# Patient Record
Sex: MALE | Race: WHITE | NOT HISPANIC OR LATINO | Employment: FULL TIME | URBAN - METROPOLITAN AREA
[De-identification: names, ages, dates, MRNs, and addresses within clinical notes are randomized per-mention and may not be internally consistent; named-entity substitution may affect disease eponyms.]

---

## 2021-11-22 ENCOUNTER — OFFICE VISIT (OUTPATIENT)
Dept: FAMILY MEDICINE CLINIC | Facility: CLINIC | Age: 26
End: 2021-11-22

## 2021-11-22 VITALS
OXYGEN SATURATION: 98 % | WEIGHT: 140 LBS | SYSTOLIC BLOOD PRESSURE: 128 MMHG | BODY MASS INDEX: 18.55 KG/M2 | HEART RATE: 110 BPM | DIASTOLIC BLOOD PRESSURE: 76 MMHG | HEIGHT: 73 IN

## 2021-11-22 DIAGNOSIS — Z11.59 NEED FOR HEPATITIS C SCREENING TEST: ICD-10-CM

## 2021-11-22 DIAGNOSIS — Z71.1 CONCERN ABOUT STD IN MALE WITHOUT DIAGNOSIS: ICD-10-CM

## 2021-11-22 DIAGNOSIS — Z00.00 ANNUAL PHYSICAL EXAM: ICD-10-CM

## 2021-11-22 DIAGNOSIS — R10.30 LOWER ABDOMINAL PAIN: Primary | ICD-10-CM

## 2021-11-22 DIAGNOSIS — K59.09 OTHER CONSTIPATION: ICD-10-CM

## 2021-11-22 PROCEDURE — 99204 OFFICE O/P NEW MOD 45 MIN: CPT

## 2021-11-22 PROCEDURE — 74018 RADEX ABDOMEN 1 VIEW: CPT

## 2021-11-22 RX ORDER — POLYETHYLENE GLYCOL 3350 17 G/17G
17 POWDER, FOR SOLUTION ORAL 2 TIMES DAILY
Qty: 20 EACH | Refills: 0 | Status: SHIPPED | OUTPATIENT
Start: 2021-11-22 | End: 2021-12-09

## 2021-11-22 NOTE — PROGRESS NOTES
"Isaias Meraz is a 26 y.o. male. Presents today as a New patient here to establish care    Chief Complaint   Patient presents with   • Annual Exam       History of Present Illness     Previous PCP- pediatrician Dr. Estrada at [facility]. # [id number]  Previous Medical Hx: Hx undescended Testicle- resolved . Whooping cough, asthma- allergy induced.   Family Hx: Mother debra suzeat ca, BRAC 2 gene; DM both sides of family. Aunt: Lupus.   Social Hx: Stopped smoking cigarettes 5-6 months ago x 4-5 years. Vaping now. Marijuana occasional.   Rare alcohol use.   Has girlfriend. Sexually active in monogamous relation. Condoms sometime. States wants tested for STDs  Works at [employer]     Abdominal Pain  Last week had an episode of severe pain, bloating, chills, nausea,no appetite  States looked symptoms online and thought consistent with diverticulitis.  Started Diverticulitis diet and felt better.  Had diarrhea but now is having soft but \"skinny\" stools at times. States still not going to bathroom as usual. Did have constipation prior to episode of abd pain  Abd pain much better, occasional tenderness, mostly LLQ  Started soft foods, apple sauce, bananas, soup & liquids. Tolerating well  No more chills, fever. No unexpected weight changes. Denies hematochezia, melena, rectal bleed or pain.   Pt concerned with diverticulosis vs diverticulitis and afraid he may \"have something bad\"  No recent travel. Does eat sushi a lot.    HM  Tetanus shot last year at work  Flu vaccine due  Covid vaccine due    Review of Systems   Constitutional: Positive for chills (last week. resolved). Negative for activity change, fatigue, fever and unexpected weight change.   HENT: Negative.    Respiratory: Negative.    Cardiovascular: Negative.    Gastrointestinal: Positive for abdominal pain, constipation, diarrhea and nausea. Negative for abdominal distention, anal bleeding, blood in stool, rectal pain and vomiting. " "  Endocrine: Negative.    Musculoskeletal: Negative for myalgias.   Skin: Negative for rash.   Allergic/Immunologic: Negative for environmental allergies and food allergies.   Neurological: Negative for dizziness, weakness, light-headedness and headaches.   Hematological: Negative.    Psychiatric/Behavioral: Negative.        There is no problem list on file for this patient.    History reviewed. No pertinent past medical history.  Past Surgical History:   Procedure Laterality Date   • HERNIA REPAIR       History reviewed. No pertinent family history.  Social History     Socioeconomic History   • Marital status: Single   Tobacco Use   • Smoking status: Former Smoker     Quit date: 2021     Years since quittin.4   • Smokeless tobacco: Never Used   Vaping Use   • Vaping Use: Every day   • Substances: Nicotine   Substance and Sexual Activity   • Alcohol use: Not Currently   • Drug use: Yes     Types: Marijuana   • Sexual activity: Defer       No Known Allergies    No current outpatient medications on file prior to visit.     No current facility-administered medications on file prior to visit.       Objective   Vitals:    21 1544   BP: 128/76   Pulse: 110   SpO2: 98%   Weight: 63.5 kg (140 lb)   Height: 185.4 cm (73\")     Body mass index is 18.47 kg/m².  Physical Exam  Constitutional:       Appearance: Normal appearance.   HENT:      Head: Normocephalic.   Eyes:      Conjunctiva/sclera: Conjunctivae normal.   Neck:      Thyroid: No thyroid mass, thyromegaly or thyroid tenderness.      Vascular: No carotid bruit.   Cardiovascular:      Rate and Rhythm: Normal rate and regular rhythm.      Pulses: Normal pulses.      Heart sounds: No murmur heard.      Pulmonary:      Effort: Pulmonary effort is normal.      Breath sounds: Normal breath sounds. No wheezing or rhonchi.   Abdominal:      General: Abdomen is flat. Bowel sounds are normal. There is no distension.      Palpations: Abdomen is soft. There is no " hepatomegaly, mass or pulsatile mass.      Tenderness: There is no abdominal tenderness. There is no right CVA tenderness, left CVA tenderness, guarding or rebound.      Hernia: No hernia is present.   Musculoskeletal:      Cervical back: Normal range of motion and neck supple. No rigidity or tenderness.   Lymphadenopathy:      Cervical: No cervical adenopathy.      Right cervical: No superficial cervical adenopathy.     Left cervical: No superficial cervical adenopathy.   Neurological:      General: No focal deficit present.      Mental Status: He is alert and oriented to person, place, and time.   Psychiatric:         Attention and Perception: Attention normal.         Mood and Affect: Mood normal.         Behavior: Behavior normal.         Thought Content: Thought content normal.         Cognition and Memory: Cognition normal.         Judgment: Judgment normal.         Assessment/Plan   Diagnoses and all orders for this visit:    1. Lower abdominal pain (Primary)  -     XR Abdomen KUB (In Office)    2. Need for hepatitis C screening test  -     Hepatitis C antibody    3. Annual physical exam  -     Comprehensive metabolic panel  -     Hepatitis C antibody  -     CBC (No Diff)    4. Influenza vaccine needed        -     Declined    5. Concern about STD in male without diagnosis  -     RPR  -     Chlamydia trachomatis, Neisseria gonorrhoeae, PCR - , Urine, Clean Catch  -     HIV-1/O/2 Ag/Ab w Reflex  -     Hepatitis B surface antigen  -     Hepatitis A antibody, total  -     Hepatitis B core antibody, total  -     Hepatitis B surface antibody    6. Other constipation  -     polyethylene glycol (MIRALAX) 17 g packet; Take 17 g by mouth 2 (Two) Times a Day.  Dispense: 20 each; Refill: 0      Abd pain: Explained diff btw diverticulitis vs diverticulosis. His acute episode of last week more consistent with gastroenteritis.   KUB with large amount of stool. Will send to radiology to read. Start Miralax. Add fiber to  diet. Increase fluids. Cont soft diet and advanced as tolerated.  Will test for STDs as sexually active and does not always use condoms. Pt also wants testing done.   HM: Tdap at work last year- will try to get records. Flu vaccine due- defers today- wants to address at next visit. Will check CBC &CMP.   Counseled on a healthy diet. Use condoms 100% of time with sex to protect against STIs. Eat a healthy diet and exercise routinely. Avoid smoking/alcohol and drugs. Use seatbelt 100% of time.       RTC in one week if no improvement in symptoms. Otherwise f/u in one year for annual or sooner as needed.        This document is intended for medical expert use only. Reading of this document by patients and/or patient's family without participating medical staff guidance may result in misinterpretation and unintended morbidity.  Any interpretation of such data is the responsibility of the patient and/or family member responsible for the patient in concert with their primary or specialist providers, not to be left for sources of online searches such as [employer], [employer] or similar queries. Relying on these approaches to knowledge may result in misinterpretation, misguided goals of care and even death should patients or family members try recommendations outside of the realm of professional medical care in a supervised way.     Please allow 3-5 business days for recommendations based on new results     Go to the ER for any possible lifethreatening symptoms such as chest pain or shortness of air.      I personally spent 45 minutes with this patient, preparing for the visit, reviewing tests, obtaining and/or reviewing a separately obtained history, performing a medically appropriate examination and/or evaluation , counseling and educating the patient/family/caregiver, ordering medications, tests, or procedures, documenting information in the medical record and independently interpreting results.

## 2021-11-23 ENCOUNTER — TELEPHONE (OUTPATIENT)
Dept: FAMILY MEDICINE CLINIC | Facility: CLINIC | Age: 26
End: 2021-11-23

## 2021-11-23 NOTE — TELEPHONE ENCOUNTER
PATIENT REQUESTING A NOTE FOR WORK FOR EXCESSIVE DIARRHEA AND INABILITY TO GO TO WORK.  CAN THE NOTE BE EMAILED?   [email or web address]    CALL BACK #: [phone]

## 2021-11-24 ENCOUNTER — DOCUMENTATION (OUTPATIENT)
Dept: FAMILY MEDICINE CLINIC | Facility: CLINIC | Age: 26
End: 2021-11-24

## 2021-11-24 LAB
HAV AB SER QL IA: POSITIVE
HBV CORE AB SERPL QL IA: NEGATIVE
HBV SURFACE AB SER QL: NON REACTIVE
HBV SURFACE AG SERPL QL IA: NEGATIVE
HIV 1+2 AB+HIV1 P24 AG SERPL QL IA: NON REACTIVE
RPR SER QL: NON REACTIVE

## 2021-11-29 LAB
C TRACH RRNA SPEC QL NAA+PROBE: NEGATIVE
N GONORRHOEA RRNA SPEC QL NAA+PROBE: NEGATIVE
WRITTEN AUTHORIZATION: NORMAL

## 2021-11-29 NOTE — PROGRESS NOTES
Please call patient to inform of positive hepatitis A. This may be new or old infection. Will add other labs to conform.   Usually resolves on its own. No current treatment. Usually rest and plenty of fluids.   It is highly contagious through food. Make sure to wash hands frequently with soap and water specially when preparing food, eating. Cook all food thoroughly, avoid raw foods.   Avoid alcohol, and OTC medications such as tylenol as may affect liver- until feeling better.   Symptoms include low appetite, nausea, fatigue, abd pain, fever. Symptoms should improve/resolve within 3 months but in some people may take a little longer.  If worsening symptoms or jaundice call clinic or go to ED.   Other labs normal.

## 2021-11-30 ENCOUNTER — OFFICE VISIT (OUTPATIENT)
Dept: FAMILY MEDICINE CLINIC | Facility: CLINIC | Age: 26
End: 2021-11-30

## 2021-11-30 VITALS
SYSTOLIC BLOOD PRESSURE: 124 MMHG | BODY MASS INDEX: 18.02 KG/M2 | OXYGEN SATURATION: 99 % | HEIGHT: 73 IN | HEART RATE: 85 BPM | WEIGHT: 136 LBS | TEMPERATURE: 98.4 F | DIASTOLIC BLOOD PRESSURE: 72 MMHG

## 2021-11-30 DIAGNOSIS — B15.9 VIRAL HEPATITIS A WITHOUT HEPATIC COMA: Primary | ICD-10-CM

## 2021-11-30 DIAGNOSIS — R10.30 LOWER ABDOMINAL PAIN: ICD-10-CM

## 2021-11-30 PROCEDURE — 99213 OFFICE O/P EST LOW 20 MIN: CPT

## 2021-11-30 NOTE — PROGRESS NOTES
"Subjective   Bart Meraz is a 26 y.o. male. Who presents to follow up on abd pain    History of Present Illness     Abdominal Pain    I saw him on 11/22/21 with complaints of abd pain & diarrhea  Positive for hep A  Still having some diarrhea but still taking Miralax as was having some constipation prior  No hematochezia, no nausea, no vomiting, no jaundice, no changes in stool/urine color.   Occasional tenderness in LLQ and RUQ- feeling fatigued    No one in household sick  Mother concerned about grandchild who is 11 months.    The following portions of the patient's history were reviewed and updated as appropriate: allergies, current medications, past family history, past medical history, past social history and past surgical history.    Review of Systems   Constitutional: Positive for appetite change and fatigue. Negative for chills, fever and unexpected weight loss.   Respiratory: Negative.    Cardiovascular: Negative.    Gastrointestinal: Positive for abdominal pain and diarrhea. Negative for blood in stool, constipation, nausea, rectal pain, vomiting and GERD.   Hematological: Negative.        Objective    /72   Pulse 85   Temp 98.4 °F (36.9 °C) (Infrared)   Ht 185.4 cm (73\")   Wt 61.7 kg (136 lb)   SpO2 99%   BMI 17.94 kg/m²     Physical Exam  Constitutional:       Appearance: Normal appearance.   HENT:      Head: Normocephalic.      Mouth/Throat:      Mouth: Mucous membranes are moist.      Pharynx: Oropharynx is clear. No oropharyngeal exudate or posterior oropharyngeal erythema.   Eyes:      General: No scleral icterus.     Conjunctiva/sclera: Conjunctivae normal.      Pupils: Pupils are equal, round, and reactive to light.   Cardiovascular:      Rate and Rhythm: Normal rate and regular rhythm.      Pulses: Normal pulses.      Heart sounds: No murmur heard.      Pulmonary:      Effort: Pulmonary effort is normal. No respiratory distress.      Breath sounds: Normal breath sounds.   Abdominal:     "  General: Bowel sounds are normal. There is no distension or abdominal bruit.      Palpations: Abdomen is soft. There is no fluid wave, hepatomegaly, splenomegaly, mass or pulsatile mass.      Tenderness: There is generalized abdominal tenderness (SANDRA lower quadrants) and tenderness in the right lower quadrant and left lower quadrant. There is no guarding or rebound.   Neurological:      General: No focal deficit present.      Mental Status: He is alert and oriented to person, place, and time.   Psychiatric:         Attention and Perception: Attention normal.         Mood and Affect: Mood normal.         Speech: Speech normal.         Behavior: Behavior normal.         Thought Content: Thought content normal.         Cognition and Memory: Cognition normal.         Judgment: Judgment normal.       Assessment/Plan   Diagnoses and all orders for this visit:    1. Viral hepatitis A without hepatic coma (Primary)    2. Lower abdominal pain    Discussed symptoms, treatment and transmission of hep A. Handout given.  Pt UTD with vaccines as a child. Discussed per guidelines should be ok as not immunocompromised or at increased risk for severe infection.   May consider another dose of vaccine after reviews literature.  Advised to make sure grandchild gets her vaccine and advised on signs of disease to look for.  Wash hands thoroughly with soap and water frequently and after using bathroom and handling food. Cook all foods thoroughly and avoid eating uncooked foods such as sushi at this time. Wipe surfaces with bleach wipes.   Advised symptoms may persist between 3-9 months in some cases but should get better. If elevated fever, severe abd pain, jaundice seek care immediately.   Rest, nutrition and increased fluids advised.    Notified pt and mother that this is a reportable disease and we had to report.     Call or RTC if worsening symptoms. Otherwise F/U in 6 months or sooner as needed.     This document is intended for  medical expert use only. Reading of this document by patients and/or patient's family without participating medical staff guidance may result in misinterpretation and unintended morbidity.  Any interpretation of such data is the responsibility of the patient and/or family member responsible for the patient in concert with their primary or specialist providers, not to be left for sources of online searches such as [employer], [employer] or similar queries. Relying on these approaches to knowledge may result in misinterpretation, misguided goals of care and even death should patients or family members try recommendations outside of the realm of professional medical care in a supervised way.     Please allow 3-5 business days for recommendations based on new results     Go to the ER for any possible lifethreatening symptoms such as chest pain or shortness of air.      I personally spent 20 minutes with this patient, preparing for the visit, reviewing tests, obtaining and/or reviewing a separately obtained history, performing a medically appropriate examination and/or evaluation , counseling and educating the patient/family/caregiver, ordering medications, tests, or procedures, documenting information in the medical record and independently interpreting results.

## 2021-12-06 ENCOUNTER — TELEPHONE (OUTPATIENT)
Dept: FAMILY MEDICINE CLINIC | Facility: CLINIC | Age: 26
End: 2021-12-06

## 2021-12-06 ENCOUNTER — READMISSION MANAGEMENT (OUTPATIENT)
Dept: CALL CENTER | Facility: HOSPITAL | Age: 26
End: 2021-12-06

## 2021-12-06 ENCOUNTER — TRANSITIONAL CARE MANAGEMENT TELEPHONE ENCOUNTER (OUTPATIENT)
Dept: CALL CENTER | Facility: HOSPITAL | Age: 26
End: 2021-12-06

## 2021-12-06 NOTE — OUTREACH NOTE
Call Center TCM Note      Responses   [facility] patient discharged from? Non-   Does the patient have one of the following disease processes/diagnoses(primary or secondary)? Other   TCM attempt successful? Yes   Call start time 1505   Call end time 1513   Discharge diagnosis irregular heartbeat--seen at [facility] ED   Meds reviewed with patient/caregiver? Yes   Does the patient have all medications ordered at discharge? Yes   Is the patient taking all medications as directed (includes completed medication regime)? Yes   Medication comments REc'd prescription for muscle relaxer and potassium supplement   Does the patient have a primary care provider?  Yes   Does the patient have an appointment with their PCP within 7 days of discharge? Yes   Comments regarding PCP Hospital PCP FOLLOW UP APPOINTMENT IS 12/9/21@1000am   Has the patient kept scheduled appointments due by today? N/A   Has home health visited the patient within 72 hours of discharge? N/A   Psychosocial issues? No   Did the patient receive a copy of their discharge instructions? Yes   Nursing interventions Reviewed instructions with patient   What is the patient's perception of their health status since discharge? Improving  [Pt reports he started experiencing muscle cramps, trouble breathing, couldn't open hands.  He rec'd fluids and noted to be low on potassium, rec'd KCL po in ED. ]   Is the patient/caregiver able to teach back signs and symptoms related to disease process for when to call PCP? Yes   Is the patient/caregiver able to teach back signs and symptoms related to disease process for when to call 911? Yes   Additional teach back comments Pt feels much improved today and is resting/recovering---denied vomiting/diarrhea prior to onset of s/s.          Little Strong RN    12/6/2021, 15:14 EST

## 2021-12-06 NOTE — OUTREACH NOTE
Prep Survey      Responses   [facility] [place] facility patient discharged from? Non-BH   Is LACE score < 7 ? Non-BH Discharge   Emergency Room discharge w/ pulse ox? No   Eligibility [facility]   Date of Discharge 12/04/21   Discharge diagnosis irregular heartbeat   Does the patient have one of the following disease processes/diagnoses(primary or secondary)? Other   Prep survey completed? Yes          Birdie Mata RN

## 2021-12-06 NOTE — TELEPHONE ENCOUNTER
Caller: Bart Meraz    Relationship to patient: Self    Best call back number:[phone] (H)    New or established patient?  [] New  [x] Established    Date of discharge: 12/4/21    Facility discharged from: [facility]    Diagnosis/Symptoms: IRREGULAR HEARTBEAT    Length of stay (If applicable):     Specialty Only: Did you see a [employer] [facility] provider?    [] Yes  [] No  If so, who?

## 2021-12-09 ENCOUNTER — OFFICE VISIT (OUTPATIENT)
Dept: FAMILY MEDICINE CLINIC | Facility: CLINIC | Age: 26
End: 2021-12-09

## 2021-12-09 VITALS
DIASTOLIC BLOOD PRESSURE: 72 MMHG | SYSTOLIC BLOOD PRESSURE: 114 MMHG | OXYGEN SATURATION: 99 % | HEIGHT: 73 IN | BODY MASS INDEX: 17.63 KG/M2 | HEART RATE: 91 BPM | WEIGHT: 133 LBS

## 2021-12-09 DIAGNOSIS — E87.6 HYPOKALEMIA: Primary | ICD-10-CM

## 2021-12-09 DIAGNOSIS — B15.9 VIRAL HEPATITIS A WITHOUT HEPATIC COMA: ICD-10-CM

## 2021-12-09 PROCEDURE — 99213 OFFICE O/P EST LOW 20 MIN: CPT

## 2021-12-09 RX ORDER — POTASSIUM CHLORIDE 1500 MG/1
20 TABLET, FILM COATED, EXTENDED RELEASE ORAL
COMMUNITY
Start: 2021-12-04 | End: 2021-12-11

## 2021-12-09 RX ORDER — BACLOFEN 20 MG/1
20 TABLET ORAL 3 TIMES DAILY
COMMUNITY
Start: 2021-12-05 | End: 2022-03-10

## 2021-12-09 NOTE — PROGRESS NOTES
"Subjective   Bart Meraz is a 26 y.o. male. Who presents for a hospital follow up     History of Present Illness     Went to [facility] with complaints of chest pain, muscle spasms, SOA and tingling in body  Was dx with hypokalemia & atypical CP and discharged home on PO K and Baclofen TID x 1 week.   EKG with ST, no ectopy and CT abd and CTA  with no acute findings    Pt states has been feeling better, still a little weak  No more CP, SOA, Spasms, tingling, abd pain, diarrhea  Has been taking PO K and states did not like how Baclofen made him feel so has been taking once a day.   Has been eating a healthy diet with baked chicken, rice, potatoes, fruit and vegetables. Did have corn dogs last week that gave him diarrhea  Has been trying to drink water. No alcohol intake.   Has lost some weight as was not able to eat much. Trying to increase foods in diet slowly as he tolerates    The following portions of the patient's history were reviewed and updated as appropriate: allergies, current medications, past family history, past medical history, past social history and past surgical history.    Review of Systems   Constitutional: Positive for fatigue. Negative for activity change, chills, fever, unexpected weight gain and unexpected weight loss.   Respiratory: Negative.    Cardiovascular: Negative.    Gastrointestinal: Negative for abdominal distention, abdominal pain, constipation, diarrhea, nausea and indigestion.   Musculoskeletal: Negative for myalgias.   Neurological: Negative for dizziness, weakness, light-headedness and headache.       Objective    /72   Pulse 91   Ht 185.4 cm (72.99\")   Wt 60.3 kg (133 lb)   SpO2 99%   BMI 17.55 kg/m²     Physical Exam  Constitutional:       General: He is not in acute distress.     Appearance: He is well-groomed and underweight. He is not ill-appearing.   HENT:      Mouth/Throat:      Mouth: Mucous membranes are dry.      Pharynx: Oropharynx is clear. No " oropharyngeal exudate or posterior oropharyngeal erythema.   Eyes:      General: No scleral icterus.     Conjunctiva/sclera: Conjunctivae normal.      Pupils: Pupils are equal, round, and reactive to light.   Neck:      Vascular: No carotid bruit.   Cardiovascular:      Rate and Rhythm: Normal rate and regular rhythm.      Pulses: Normal pulses.      Heart sounds: Normal heart sounds. No murmur heard.      Pulmonary:      Effort: Pulmonary effort is normal. No respiratory distress.      Breath sounds: Normal breath sounds. No wheezing.   Abdominal:      General: Bowel sounds are normal. There is no distension or abdominal bruit.      Palpations: There is no hepatomegaly, splenomegaly, mass or pulsatile mass.      Tenderness: There is no abdominal tenderness. There is no guarding.   Neurological:      General: No focal deficit present.      Mental Status: He is alert and oriented to person, place, and time.   Psychiatric:         Attention and Perception: Attention normal.         Mood and Affect: Mood normal.         Speech: Speech normal.         Behavior: Behavior normal.         Thought Content: Thought content normal.         Cognition and Memory: Cognition normal.         Judgment: Judgment normal.         Assessment/Plan   Diagnoses and all orders for this visit:    1. Hypokalemia (Primary)  -     Comprehensive metabolic panel  -     Magnesium    2. Viral hepatitis A without hepatic coma  -     Hepatitis A Antibody, IgM    - Discussed increase foods in diet as tolerated. Advised frequent small portions. Add healthy proteins.    Ensure samples given to see if tolerates- can add as snack in btw meals if able to tolerate.   - Rest and stay hydrated. At least 8 cups water/day  - Will recheck labs to see if improved. Pt denies CP, muscle pain/spasms.     - Called lab to check on status on pending labs and they stated they do not have these and have to redraw. Discussed with pt and wants to come back in am to get  Hep A IgM.      RTC in one week to make sure improving as still feeling weak.  Advised if similar symptoms occur go to ED.     This document is intended for medical expert use only. Reading of this document by patients and/or patient's family without participating medical staff guidance may result in misinterpretation and unintended morbidity.  Any interpretation of such data is the responsibility of the patient and/or family member responsible for the patient in concert with their primary or specialist providers, not to be left for sources of online searches such as [employer], [employer] or similar queries. Relying on these approaches to knowledge may result in misinterpretation, misguided goals of care and even death should patients or family members try recommendations outside of the realm of professional medical care in a supervised way.     Please allow 3-5 business days for recommendations based on new results     Go to the ER for any possible lifethreatening symptoms such as chest pain or shortness of air.      I personally spent 20 minutes with this patient, preparing for the visit, reviewing tests, obtaining and/or reviewing a separately obtained history, performing a medically appropriate examination and/or evaluation , counseling and educating the patient/family/caregiver, ordering medications, tests, or procedures, documenting information in the medical record and independently interpreting results.

## 2021-12-10 LAB
ALBUMIN SERPL-MCNC: 5 G/DL (ref 4.1–5.2)
ALBUMIN/GLOB SERPL: 2.5 {RATIO} (ref 1.2–2.2)
ALP SERPL-CCNC: 39 IU/L (ref 44–121)
ALT SERPL-CCNC: 14 IU/L (ref 0–44)
AST SERPL-CCNC: 18 IU/L (ref 0–40)
BILIRUB SERPL-MCNC: 0.5 MG/DL (ref 0–1.2)
BUN SERPL-MCNC: 10 MG/DL (ref 6–20)
BUN/CREAT SERPL: 11 (ref 9–20)
CALCIUM SERPL-MCNC: 10.2 MG/DL (ref 8.7–10.2)
CHLORIDE SERPL-SCNC: 103 MMOL/L (ref 96–106)
CO2 SERPL-SCNC: 23 MMOL/L (ref 20–29)
CREAT SERPL-MCNC: 0.93 MG/DL (ref 0.76–1.27)
GLOBULIN SER CALC-MCNC: 2 G/DL (ref 1.5–4.5)
GLUCOSE SERPL-MCNC: 94 MG/DL (ref 65–99)
HAV IGM SERPL QL IA: NEGATIVE
MAGNESIUM SERPL-MCNC: 2.1 MG/DL (ref 1.6–2.3)
POTASSIUM SERPL-SCNC: 4.9 MMOL/L (ref 3.5–5.2)
PROT SERPL-MCNC: 7 G/DL (ref 6–8.5)
SODIUM SERPL-SCNC: 140 MMOL/L (ref 134–144)

## 2021-12-16 ENCOUNTER — OFFICE VISIT (OUTPATIENT)
Dept: FAMILY MEDICINE CLINIC | Facility: CLINIC | Age: 26
End: 2021-12-16

## 2021-12-16 VITALS
DIASTOLIC BLOOD PRESSURE: 78 MMHG | HEART RATE: 94 BPM | OXYGEN SATURATION: 100 % | SYSTOLIC BLOOD PRESSURE: 114 MMHG | WEIGHT: 134 LBS | HEIGHT: 73 IN | BODY MASS INDEX: 17.76 KG/M2

## 2021-12-16 DIAGNOSIS — R53.83 OTHER FATIGUE: Primary | ICD-10-CM

## 2021-12-16 DIAGNOSIS — Z11.59 NEED FOR HEPATITIS C SCREENING TEST: ICD-10-CM

## 2021-12-16 PROBLEM — B15.9 HEPATITIS A VIRUS INFECTION: Status: ACTIVE | Noted: 2021-12-16

## 2021-12-16 PROCEDURE — 99213 OFFICE O/P EST LOW 20 MIN: CPT

## 2021-12-16 NOTE — PROGRESS NOTES
"Subjective   Bart Meraz is a 26 y.o. male. Who presents for a follow up     History of Present Illness     I saw him on 12/9/21 for a ED visit follow with complaints of CP where he was found to be hypokalemic.   On last visit he states feeling better but still weak   Labs in ED showed hypokalemia &hyponatremia, otherwise unremarkable    Pt states feels tired and sometimes feels like he cant take a deep breath. States not SOA or difficulty breathing. No cough, fevers, CP.     Has been eating better and adding more foods to his diet and tolerating well. Has tried ensures and doing well on these as well.    The following portions of the patient's history were reviewed and updated as appropriate: allergies, current medications, past family history, past medical history, past social history and past surgical history.    Review of Systems   Constitutional: Positive for fatigue. Negative for appetite change, chills and fever.   Respiratory: Negative.    Cardiovascular: Negative.    Gastrointestinal: Negative for abdominal pain, constipation, diarrhea and nausea.   Neurological: Negative for dizziness, weakness and headache.       Objective    /78   Pulse 94   Ht 185.4 cm (72.99\")   Wt 60.8 kg (134 lb)   SpO2 100%   BMI 17.68 kg/m²     Physical Exam  Constitutional:       General: He is not in acute distress.     Appearance: Normal appearance. He is not ill-appearing.   Neck:      Vascular: No carotid bruit.   Cardiovascular:      Rate and Rhythm: Normal rate and regular rhythm.      Pulses: Normal pulses.           Carotid pulses are 2+ on the right side and 2+ on the left side.     Heart sounds: Normal heart sounds, S1 normal and S2 normal. No murmur heard.      Pulmonary:      Effort: Pulmonary effort is normal. No tachypnea, bradypnea or respiratory distress.      Breath sounds: Normal breath sounds. No wheezing.   Abdominal:      General: Bowel sounds are normal.      Palpations: Abdomen is soft.      " Tenderness: There is no abdominal tenderness.   Musculoskeletal:      Right lower leg: No edema.      Left lower leg: No edema.   Neurological:      General: No focal deficit present.      Mental Status: He is alert and oriented to person, place, and time.   Psychiatric:         Mood and Affect: Mood normal.         Behavior: Behavior normal.         Thought Content: Thought content normal.         Judgment: Judgment normal.       Assessment/Plan   Diagnoses and all orders for this visit:    1. Other fatigue (Primary)  -     CBC (No Diff)  -     Vitamin D 25 Hydroxy  -     Vitamin B12  -     TSH    2. Need for hepatitis C screening test  -     Hepatitis C Antibody    - Overall doing better. Still underweight- did gain 1 lbs since last visit. Will check labs for fatigue. Continue advancing diet as tolerated. Stay hydrated. Increase activity as tolerated.   - Flu vaccine offered. Declines today. Wants to wait    RTC in 3-6 months or sooner as needed.     This document is intended for medical expert use only. Reading of this document by patients and/or patient's family without participating medical staff guidance may result in misinterpretation and unintended morbidity.  Any interpretation of such data is the responsibility of the patient and/or family member responsible for the patient in concert with their primary or specialist providers, not to be left for sources of online searches such as [employer], [employer] or similar queries. Relying on these approaches to knowledge may result in misinterpretation, misguided goals of care and even death should patients or family members try recommendations outside of the realm of professional medical care in a supervised way.     Please allow 3-5 business days for recommendations based on new results     Go to the ER for any possible lifethreatening symptoms such as chest pain or shortness of air.      I personally spent 20 minutes with this patient, preparing for the visit, reviewing  tests, obtaining and/or reviewing a separately obtained history, performing a medically appropriate examination and/or evaluation , counseling and educating the patient/family/caregiver, ordering medications, tests, or procedures, documenting information in the medical record and independently interpreting results.

## 2021-12-17 DIAGNOSIS — E55.9 VITAMIN D DEFICIENCY: Primary | ICD-10-CM

## 2021-12-17 LAB
25(OH)D3+25(OH)D2 SERPL-MCNC: 17 NG/ML (ref 30–100)
ERYTHROCYTE [DISTWIDTH] IN BLOOD BY AUTOMATED COUNT: 12 % (ref 11.6–15.4)
HCT VFR BLD AUTO: 43.4 % (ref 37.5–51)
HCV AB S/CO SERPL IA: <0.1 S/CO RATIO (ref 0–0.9)
HGB BLD-MCNC: 15 G/DL (ref 13–17.7)
MCH RBC QN AUTO: 32 PG (ref 26.6–33)
MCHC RBC AUTO-ENTMCNC: 34.6 G/DL (ref 31.5–35.7)
MCV RBC AUTO: 93 FL (ref 79–97)
PLATELET # BLD AUTO: 332 X10E3/UL (ref 150–450)
RBC # BLD AUTO: 4.69 X10E6/UL (ref 4.14–5.8)
TSH SERPL DL<=0.005 MIU/L-ACNC: 4.3 UIU/ML (ref 0.45–4.5)
VIT B12 SERPL-MCNC: 351 PG/ML (ref 232–1245)
WBC # BLD AUTO: 6.2 X10E3/UL (ref 3.4–10.8)

## 2021-12-17 RX ORDER — MELATONIN
1000 DAILY
Qty: 90 TABLET | Refills: 0 | Status: SHIPPED | OUTPATIENT
Start: 2021-12-17 | End: 2022-03-10

## 2021-12-17 NOTE — PROGRESS NOTES
Please inform pt of low level of vitamin D. I have sent a prescription for Vitamin D 1000 units daily.   low vitamin D can affect mood, fatigue, bone health, among others.   Should also add to diet things like fortified juice or cereal, fish, low fat dairy, liver, and get some sun light exposure for about 15-30 minutes 2-3 times per week if able.    I also did not discuss this during visit but if he is still using marijuana I'd advise to stop as this may be cause of some of the symptoms that he has experienced such as abd pain, nausea, feeling tired, palpitation, etc.     Repeat vitamin D lab in 3 months.

## 2022-03-10 ENCOUNTER — OFFICE VISIT (OUTPATIENT)
Dept: FAMILY MEDICINE CLINIC | Facility: CLINIC | Age: 27
End: 2022-03-10

## 2022-03-10 VITALS
HEART RATE: 93 BPM | WEIGHT: 161 LBS | SYSTOLIC BLOOD PRESSURE: 112 MMHG | HEIGHT: 73 IN | DIASTOLIC BLOOD PRESSURE: 76 MMHG | OXYGEN SATURATION: 98 % | BODY MASS INDEX: 21.34 KG/M2

## 2022-03-10 DIAGNOSIS — R53.83 OTHER FATIGUE: ICD-10-CM

## 2022-03-10 DIAGNOSIS — E55.9 VITAMIN D DEFICIENCY: Primary | ICD-10-CM

## 2022-03-10 PROCEDURE — 99213 OFFICE O/P EST LOW 20 MIN: CPT

## 2022-03-10 NOTE — PROGRESS NOTES
"Isaias Meraz is a 26 y.o. male. Who presents for a routine follow up on fatigue and vitamin D      History of Present Illness       Eating well. gaining weight- not underweight anymore. occasional left lower quadrant pain. No diarrhea, no abd pain. No fever, chills.     Still a little fatigue. Sometimes feels like has to take a deep breath. No CP, Difficulty breathing. Ran out of vitamin D.     No concerns today. Not smoking marijuana anymore. vapes at times.     The following portions of the patient's history were reviewed and updated as appropriate: allergies, current medications, past family history, past medical history, past social history and past surgical history.    Review of Systems   Constitutional: Positive for fatigue. Negative for chills and fever.   Respiratory: Negative.    Cardiovascular: Negative.    Gastrointestinal: Negative for abdominal pain, diarrhea and nausea.       Objective    /76   Pulse 93   Ht 185.4 cm (73\")   Wt 73 kg (161 lb)   SpO2 98%   BMI 21.24 kg/m²     Physical Exam  Constitutional:       Appearance: Normal appearance.   Cardiovascular:      Rate and Rhythm: Normal rate and regular rhythm.      Pulses: Normal pulses.           Carotid pulses are 2+ on the right side and 2+ on the left side.     Heart sounds: Normal heart sounds, S1 normal and S2 normal. No murmur heard.  Pulmonary:      Effort: Pulmonary effort is normal. No respiratory distress.      Breath sounds: Normal breath sounds. No decreased breath sounds or wheezing.   Musculoskeletal:      Right lower leg: No edema.      Left lower leg: No edema.   Neurological:      General: No focal deficit present.      Mental Status: He is alert and oriented to person, place, and time.   Psychiatric:         Attention and Perception: Attention normal.         Mood and Affect: Mood normal.         Speech: Speech normal.         Behavior: Behavior normal.         Thought Content: Thought content normal.         " Cognition and Memory: Cognition normal.         Judgment: Judgment normal.       Assessment/Plan   Diagnoses and all orders for this visit:    1. Vitamin D deficiency (Primary)  -     Vitamin D 25 hydroxy    2. Other fatigue  -     CBC (No Diff)    Much improved and weight improving as well. Continue healthy diet and exercise. Stay hydrated.   Will check vitamin D and CBC for anemia as still fatigue.     Follow up yearly for annual. Sooner as needed.      - Pt agrees with plan of care and states no further concerns or questions today    This document is intended for medical expert use only. Reading of this document by patients and/or patient's family without participating medical staff guidance may result in misinterpretation and unintended morbidity.  Any interpretation of such data is the responsibility of the patient and/or family member responsible for the patient in concert with their primary or specialist providers, not to be left for sources of online searches such as [employer], [employer] or similar queries. Relying on these approaches to knowledge may result in misinterpretation, misguided goals of care and even death should patients or family members try recommendations outside of the realm of professional medical care in a supervised way.     Please allow 3-5 business days for recommendations based on new results     Go to the ER for any possible lifethreatening symptoms such as chest pain or shortness of air.      I personally spent 20 minutes with this patient, preparing for the visit, reviewing tests, obtaining and/or reviewing a separately obtained history, performing a medically appropriate examination and/or evaluation , counseling and educating the patient/family/caregiver, ordering medications, tests, or procedures, documenting information in the medical record and independently interpreting results.

## 2022-03-11 DIAGNOSIS — E55.9 VITAMIN D DEFICIENCY: Primary | ICD-10-CM

## 2022-03-11 LAB
25(OH)D3+25(OH)D2 SERPL-MCNC: 24.8 NG/ML (ref 30–100)
ERYTHROCYTE [DISTWIDTH] IN BLOOD BY AUTOMATED COUNT: 12.2 % (ref 11.6–15.4)
HCT VFR BLD AUTO: 40.7 % (ref 37.5–51)
HGB BLD-MCNC: 14.1 G/DL (ref 13–17.7)
MCH RBC QN AUTO: 30.9 PG (ref 26.6–33)
MCHC RBC AUTO-ENTMCNC: 34.6 G/DL (ref 31.5–35.7)
MCV RBC AUTO: 89 FL (ref 79–97)
PLATELET # BLD AUTO: 317 X10E3/UL (ref 150–450)
RBC # BLD AUTO: 4.57 X10E6/UL (ref 4.14–5.8)
WBC # BLD AUTO: 5.7 X10E3/UL (ref 3.4–10.8)

## 2022-03-11 RX ORDER — FAMOTIDINE 20 MG
1000 TABLET ORAL DAILY
Qty: 60 CAPSULE | Refills: 0 | Status: SHIPPED | OUTPATIENT
Start: 2022-03-11

## 2022-03-11 NOTE — PROGRESS NOTES
Please inform pt of lab results  Vitamin D improved but stilly mildly low. I have sent a refill on vitamin D, take 1000 units daily x 2 months and then may stop.   Can also add to diet things like fortified juice or cereal, fish, low fat dairy, liver, and get some sun light exposure for about 15-30 minutes 2-3 times per week to avoid low Vitamin D levels    Blood levels normal. No anemia    Repeat in 1 year